# Patient Record
Sex: FEMALE | Employment: OTHER | URBAN - METROPOLITAN AREA
[De-identification: names, ages, dates, MRNs, and addresses within clinical notes are randomized per-mention and may not be internally consistent; named-entity substitution may affect disease eponyms.]

---

## 2017-05-22 ENCOUNTER — OFFICE VISIT (OUTPATIENT)
Dept: ENDOCRINOLOGY | Facility: CLINIC | Age: 67
End: 2017-05-22

## 2017-05-22 VITALS
DIASTOLIC BLOOD PRESSURE: 80 MMHG | SYSTOLIC BLOOD PRESSURE: 124 MMHG | WEIGHT: 141.2 LBS | HEART RATE: 73 BPM | BODY MASS INDEX: 22.69 KG/M2 | HEIGHT: 66 IN

## 2017-05-22 DIAGNOSIS — M85.9 LOW BONE DENSITY: ICD-10-CM

## 2017-05-22 DIAGNOSIS — E04.9 GOITER: ICD-10-CM

## 2017-05-22 DIAGNOSIS — Z78.0 POSTMENOPAUSAL STATUS: ICD-10-CM

## 2017-05-22 DIAGNOSIS — E03.8 SUBCLINICAL HYPOTHYROIDISM: ICD-10-CM

## 2017-05-22 DIAGNOSIS — E06.3 HASHIMOTO'S THYROIDITIS: ICD-10-CM

## 2017-05-22 DIAGNOSIS — E06.3 HASHIMOTO'S THYROIDITIS: Primary | ICD-10-CM

## 2017-05-22 LAB
CALCIUM SERPL-MCNC: 9 MG/DL (ref 8.5–10.1)
CREAT SERPL-MCNC: 0.64 MG/DL (ref 0.52–1.04)
GFR SERPL CREATININE-BSD FRML MDRD: NORMAL ML/MIN/1.7M2
PHOSPHATE SERPL-MCNC: 3.4 MG/DL (ref 2.5–4.5)
PTH-INTACT SERPL-MCNC: 54 PG/ML (ref 12–72)
TSH SERPL DL<=0.005 MIU/L-ACNC: 2.17 MU/L (ref 0.4–4)

## 2017-05-22 RX ORDER — LEVOTHYROXINE SODIUM 125 MCG
TABLET ORAL
Qty: 45 TABLET | Refills: 3 | Status: SHIPPED | OUTPATIENT
Start: 2017-05-22 | End: 2018-04-10

## 2017-05-22 ASSESSMENT — PAIN SCALES - GENERAL: PAINLEVEL: NO PAIN (0)

## 2017-05-22 NOTE — PATIENT INSTRUCTIONS
Osteoporosis Risk Score/FRAX score   Risk of Hip Fracture: 1.2 (Significant if >3%)  Risk of Overall major Fracture: 17 (Significant if >20%)      CALCIUM Recommendation 1200 mg/day in divided dose of no more than 500 mg/dose. This includes what is in your food and also what is in any supplements you are taking.      Dietary sources of calcium: These also contain vitamin D  Milk / buttermilk              8 oz            300 mg  Dry milk powder       5 Tbsp             300 mg  Yogurt                          1 cup           400 mg  Ice cream   1/2 cup          100 mg  Hard cheese                     1.5 oz          300 mg  Cottage cheese                1/2 cup        65 mg  Spinach, cooked  1 cup  240 mg  Tofu, soft regular           4 oz          120 to 390 mg  Sardines                       3 oz               370 mg  Mackerel canned         3 oz                250 mg  Canned salmon with bones 3 oz        170-210 mg  Calcium fortified cereals are available  SILK soy and almond milk products are calcium fortified  Orange juice  Fortified with Calcium       8 oz            300 mg  Low fat dairy sources are recommended      Recommended exercise goals:    30 minutes of moderate exercise on most days of the week .  Weight bearing exercise (ie, walking, jogging, hiking, dancing)    Strength training 2 or more times/week in addition to other weight -being exercise.  Strength training -- uses weight or resistance beyond that seen in everyday activities -(pilates, weight training with free weights, weight machines or resistance bands)

## 2017-05-22 NOTE — PROGRESS NOTES
"Assessment   1. Hashimoto's thyroiditis with goiter and subclinical (borderline) hypothyroidism- She is clinically euthyroid.   Continue synthroid at present dose.  She continues to require once/year follow up TFTS with thyroid exam, or sooner prn.  She may return to her PCP for this and see us prn, or she may return in one year.  Refilled Synthroid.     2.  Low bone density , lowest T-score -1.7. Height loss 1 inch.  FRAX 17/1.2.  I have counseled her on calcium intake and bone exercise.  See patient instructions.   Bone related labs today.     Post menopausal    Franchesca Szymanski MD   Endocrinology & Diabetes    HPI   Danae is a 66 year old woman who returns for follow up related to goiter and Hashimoto thyroiditis with borderline subclinical hypothyroidism. She was last seen by me 4/16.     I have been seeing her about once/year since 2006. She had initially reported ? Goiter had been  present since ? College .  The thyroid was 1.5-2 times normal in size at our lst meeting.  I started her on L-T4 2/06. She was not biochemically (highest TSH 2.84)  hypothyroid prior to starting the L-T4.   At our last visit  she was on 1/2 of the 125 mcg Synthroid tablet/day.  She continues on this.    We have the following recent labs, all on Care Everywhere ([facility])  2/21/12: TSH 2.72, freee T4 1.3  2/22/13: TSH 2.4, free T4 1.1, HgbA1c 5%  3/28/14; TSH 2.74, free T4 1.04  3/10/15; TSH 2.13, free T4 1.05    Past thyroid US 3/28/14 shows a heterogeneous gland consistent with Hashimoto thyroitiditis - without nodules.     DXA 9/20/16 showed lowest T-score -1.7 at the left femoral neck.  I have reveiwed the images and I agree with this interpretation.  I can't see the prior images to document change in BMD.    She has no history of bone fracture.  Her maximum adult height was 5'7. She was 5'6\" today.  Family hsitory osteoporosis in her mother.  She fractured her hip around age 90.  Her father also fractured a hip aruond age 95.  " "    ROS   Weight down 3 # since last yaer.  Eats a rigid diet.   Cardiac: negative  GI: constipation  Had a knee replacement Halloween  Walking is better  Arthritis pain - hips  No bone pain  + low back piain  Never had a kidney stones.     Personal Hx   Behavioral history: No tobacco use.   Home environment: No secondhand tobacco smoke in home.    - Plays a lot of golf in the summer; she has no formal exercise otehrwise.     Milk 1 glass/day and in coffee  Yogurt qod  Cottage cheese weekly  Ice cream not often  Cheese daily    PMH   Past Medical History:   Diagnosis Date     Hashimoto's thyroiditis     goiter, subclinical hypothyroid     Post-menopausal      Rheumatoid arthritis, adult (H)     plaquenil     Past Surgical History:   Procedure Laterality Date     ARTHROSCOPY KNEE RT/LT      rt      SECTION       STRIP VEIN Bilateral      THORACOTOMY Left     congenital lung cyst     TONSILLECTOMY  age 3-4 years     Current Outpatient Prescriptions   Medication     hydroxychloroquine (PLAQUENIL) 200 MG tablet     SYNTHROID 125 MCG tablet     meloxicam (MOBIC) 15 MG tablet     No current facility-administered medications for this visit.      She is not taking calcium or vitamin D    Physical Exam   GENERAL: pleasant woman in no apparent distress.   /80  Pulse 73  Ht 1.676 m (5' 6\")  Wt 64 kg (141 lb 3.2 oz)  BMI 22.79 kg/m2  SKIN: normal color, normal temperature   HEENT: PER,  no scleral icterus, eyelid retraction, stare, lid lag, proptosis or conjunctival injection.   NECK: No visible neck masses, cervical adenopathy; thyroid is palpable, not enlarged. The surface is mildly irregular, without dominant nodule.   LUNGS: clear to auscultation bilaterally    CARDIAC: RRR, S1, S2 without murmurs, rubs or gallops.   NEURO: Alert responds appropriately to questions, moves all extremities, DTRs 1/4; gait normal, no tremor of the outstretched hand.   EXTREMITIES: No edema.       ENDO THYROID " LABS-[facility] [[email or web address]] Latest Ref Rng & Units 4/11/2016 2/16/2011   TSH 0.40 - 4.00 mU/L 2.53 1.54   T4 FREE 0.76 - 1.46 ng/dL 0.96 0.97     ENDO THYROID LABS-[facility] [[email or web address]] Latest Ref Rng & Units 11/30/2009   TSH 0.40 - 4.00 mU/L 1.45   T4 FREE 0.76 - 1.46 ng/dL 1.16

## 2017-05-22 NOTE — LETTER
5/22/2017       RE: Danae Wilson  [address]  [facility] [address]     Dear Colleague,    Thank you for referring your patient, Danae Wilson, to the [facility] ENDOCR[facility] at [facility]. Please see a copy of my visit note below.    Assessment   1. Hashimoto's thyroiditis with goiter and subclinical (borderline) hypothyroidism- She is clinically euthyroid.   Continue synthroid at present dose.  She continues to require once/year follow up TFTS with thyroid exam, or sooner prn.  She may return to her PCP for this and see us prn, or she may return in one year.  Refilled Synthroid.     2.  Low bone density , lowest T-score -1.7. Height loss 1 inch.  FRAX 17/1.2.  I have counseled her on calcium intake and bone exercise.  See patient instructions.   Bone related labs today.     Post menopausal    Franchesca Szymanski MD   Endocrinology & Diabetes    HPI   Danae is a 66 year old woman who returns for follow up related to goiter and Hashimoto thyroiditis with borderline subclinical hypothyroidism. She was last seen by me 4/16.     I have been seeing her about once/year since 2006. She had initially reported ? Goiter had been  present since ? College .  The thyroid was 1.5-2 times normal in size at our lst meeting.  I started her on L-T4 2/06. She was not biochemically (highest TSH 2.84)  hypothyroid prior to starting the L-T4.   At our last visit  she was on 1/2 of the 125 mcg Synthroid tablet/day.  She continues on this.    We have the following recent labs, all on [facility] Everywhere ([facility])  2/21/12: TSH 2.72, freee T4 1.3  2/22/13: TSH 2.4, free T4 1.1, HgbA1c 5%  3/28/14; TSH 2.74, free T4 1.04  3/10/15; TSH 2.13, free T4 1.05    Past thyroid US 3/28/14 shows a heterogeneous gland consistent with Hashimoto thyroitiditis - without nodules.     DXA 9/20/16 showed lowest T-score -1.7 at the left femoral neck.  I have reveiwed the images and I agree with this interpretation.  I  "can't see the prior images to document change in BMD.    She has no history of bone fracture.  Her maximum adult height was 5'7. She was 5'6\" today.  Family hsitory osteoporosis in her mother.  She fractured her hip around age 90.  Her father also fractured a hip aruond age 95.      ROS   Weight down 3 # since last yaer.  Eats a rigid diet.   Cardiac: negative  GI: constipation  Had a knee replacement Halloween  Walking is better  Arthritis pain - hips  No bone pain  + low back piain  Never had a kidney stones.     Personal Hx   Behavioral history: No tobacco use.   Home environment: No secondhand tobacco smoke in home.    - Plays a lot of golf in the summer; she has no formal exercise otehrwise.     Milk 1 glass/day and in coffee  Yogurt qod  Cottage cheese weekly  Ice cream not often  Cheese daily    PMH   Past Medical History:   Diagnosis Date     Hashimoto's thyroiditis     goiter, subclinical hypothyroid     Post-menopausal      Rheumatoid arthritis, adult (H)     plaquenil     Past Surgical History:   Procedure Laterality Date     ARTHROSCOPY KNEE RT/LT      rt      SECTION       STRIP VEIN Bilateral      THORACOTOMY Left     congenital lung cyst     TONSILLECTOMY  age 3-4 years     Current Outpatient Prescriptions   Medication     hydroxychloroquine (PLAQUENIL) 200 MG tablet     SYNTHROID 125 MCG tablet     meloxicam (MOBIC) 15 MG tablet     No current facility-administered medications for this visit.      She is not taking calcium or vitamin D    Physical Exam   GENERAL: pleasant woman in no apparent distress.   /80  Pulse 73  Ht 1.676 m (5' 6\")  Wt 64 kg (141 lb 3.2 oz)  BMI 22.79 kg/m2  SKIN: normal color, normal temperature   HEENT: PER,  no scleral icterus, eyelid retraction, stare, lid lag, proptosis or conjunctival injection.   NECK: No visible neck masses, cervical adenopathy; thyroid is palpable, not enlarged. The surface is mildly irregular, without dominant nodule. "   LUNGS: clear to auscultation bilaterally    CARDIAC: RRR, S1, S2 without murmurs, rubs or gallops.   NEURO: Alert responds appropriately to questions, moves all extremities, DTRs 1/4; gait normal, no tremor of the outstretched hand.   EXTREMITIES: No edema.       ENDO THYROID LABS-[facility] Latest Ref Rng & Units 4/11/2016 2/16/2011   TSH 0.40 - 4.00 mU/L 2.53 1.54   T4 FREE 0.76 - 1.46 ng/dL 0.96 0.97     ENDO THYROID LABS-[facility] Latest Ref Rng & Units 11/30/2009   TSH 0.40 - 4.00 mU/L 1.45   T4 FREE 0.76 - 1.46 ng/dL 1.16       Sincerely,    Franchesca Szymanski MD

## 2017-05-22 NOTE — NURSING NOTE
Chief Complaint   Patient presents with     RECHECK     Hashimoto's follow up     Leslie Washington CMA

## 2017-05-22 NOTE — MR AVS SNAPSHOT
After Visit Summary   5/22/2017    Danae Wilson    MRN: [id number]           Patient Information     Date Of Birth          1950        Visit Information        Provider Department      5/22/2017 4:00 PM Franchesca Szymanski MD [facility]        Today's Diagnoses     Hashimoto's thyroiditis    -  1    Subclinical hypothyroidism        Goiter        Low bone density        Postmenopausal status          Care Instructions    Osteoporosis Risk Score/FRAX score   Risk of Hip Fracture: 1.2 (Significant if >3%)  Risk of Overall major Fracture: 17 (Significant if >20%)      CALCIUM Recommendation 1200 mg/day in divided dose of no more than 500 mg/dose. This includes what is in your food and also what is in any supplements you are taking.      Dietary sources of calcium: These also contain vitamin D  Milk / buttermilk              8 oz            300 mg  Dry milk powder       5 Tbsp             300 mg  Yogurt                          1 cup           400 mg  Ice cream   1/2 cup          100 mg  Hard cheese                     1.5 oz          300 mg  Cottage cheese                1/2 cup        65 mg  Spinach, cooked  1 cup  240 mg  Tofu, soft regular           4 oz          120 to 390 mg  Sardines                       3 oz               370 mg  Mackerel canned         3 oz                250 mg  Canned salmon with bones 3 oz        170-210 mg  Calcium fortified cereals are available  SILK soy and almond milk products are calcium fortified  Orange juice  Fortified with Calcium       8 oz            300 mg  Low fat dairy sources are recommended      Recommended exercise goals:    30 minutes of moderate exercise on most days of the week .  Weight bearing exercise (ie, walking, jogging, hiking, dancing)    Strength training 2 or more times/week in addition to other weight -being exercise.  Strength training -- uses weight or resistance beyond that seen in everyday activities -(tripp weight  training with free weights, weight machines or resistance bands)                Follow-ups after your visit        Your next 10 appointments already scheduled     May 22, 2017  4:00 PM CDT   (Arrive by 3:45 PM)   RETURN ENDOCRINE with Franchesca Szymanski MD   [facility] ([facility] [[email or web address])    [address] [place] [id number] [phone]              Future tests that were ordered for you today     Open Future Orders        Priority Expected Expires Ordered    TSH with free T4 reflex Routine  2018    Calcium Routine  2018    Phosphorus Routine  2018    25 Hydroxyvitamin D2 and D3 Routine  2018    Parathyroid Hormone Intact Routine  2018    Creatinine Routine  2018            Who to contact     Please call your clinic at [phone] to:    Ask questions about your health    Make or cancel appointments    Discuss your medicines    Learn about your test results    Speak to your doctor   If you have compliments or concerns about an experience at your clinic, or if you wish to file a complaint, please contact [facility] Patient Relations at [phone] or email us at [email or web address]         Additional Information About Your Visit        AppGyverhart Information     [employer] is an electronic gateway that provides easy, online access to your medical records. With [employer], you can request a clinic appointment, read your test results, renew a prescription or communicate with your care team.     To sign up for [employer] visit the website at [email or web address]   You will be asked to enter the access code listed below, as well as some personal information. Please follow the directions to create your username and password.     Your access code is: [email or web address]  Expires: 2017  6:30 AM     Your access code will  in 90 days. If you  "need help or a new code, please contact your [facility] or call [phone] for assistance.        Care EveryWhere ID     This is your Care EveryWhere ID. This could be used by other organizations to access your [place] medical records  [id number]        Your Vitals Were     Pulse Height BMI (Body Mass Index)             73 1.676 m (5' 6\") 22.79 kg/m2          Blood Pressure from Last 3 Encounters:   05/22/17 124/80   09/20/16 110/60   04/11/16 114/80    Weight from Last 3 Encounters:   05/22/17 64 kg (141 lb 3.2 oz)   09/20/16 63.5 kg (140 lb)   04/11/16 65.3 kg (144 lb)                 Where to get your medicines      These medications were sent to [facility] [address] [facility]  [address] [facility] [address]     Phone:  [phone]     SYNTHROID 125 MCG tablet          Primary Care Provider Office Phone # Fax #    Catalina Patrick [phone]       [facility] [address] [facility] [address]  [place] [address]        Thank you!     Thank you for choosing [facility]IN[facility]  for your care. Our goal is always to provide you with excellent care. Hearing back from our patients is one way we can continue to improve our services. Please take a few minutes to complete the written survey that you may receive in the mail after your visit with us. Thank you!             Your Updated Medication List - Protect others around you: Learn how to safely use, store and throw away your medicines at www.disposemymeds.org.          This list is accurate as of: 5/22/17  3:33 PM.  Always use your most recent med list.                   Brand Name Dispense Instructions for use    meloxicam 15 MG tablet    MOBIC     Take 1 tablet by mouth daily.       PLAQUENIL 200 MG tablet   Generic drug:  hydroxychloroquine      200 mg 2 times daily       SYNTHROID 125 MCG tablet   Generic drug:  levothyroxine     45 " tablet    Take 1/2 tablet daily

## 2017-05-23 LAB
DEPRECATED CALCIDIOL+CALCIFEROL SERPL-MC: NORMAL UG/L (ref 20–75)
VITAMIN D2 SERPL-MCNC: <5 UG/L
VITAMIN D3 SERPL-MCNC: 31 UG/L

## 2017-09-25 ENCOUNTER — OFFICE VISIT (OUTPATIENT)
Dept: OBGYN | Facility: CLINIC | Age: 67
End: 2017-09-25
Payer: COMMERCIAL

## 2017-09-25 VITALS
BODY MASS INDEX: 22.5 KG/M2 | DIASTOLIC BLOOD PRESSURE: 62 MMHG | HEART RATE: 60 BPM | WEIGHT: 140 LBS | HEIGHT: 66 IN | SYSTOLIC BLOOD PRESSURE: 100 MMHG

## 2017-09-25 DIAGNOSIS — Z01.419 ENCOUNTER FOR GYNECOLOGICAL EXAMINATION WITHOUT ABNORMAL FINDING: Primary | ICD-10-CM

## 2017-09-25 PROCEDURE — 87624 HPV HI-RISK TYP POOLED RSLT: CPT | Performed by: OBSTETRICS & GYNECOLOGY

## 2017-09-25 PROCEDURE — 99397 PER PM REEVAL EST PAT 65+ YR: CPT | Performed by: OBSTETRICS & GYNECOLOGY

## 2017-09-25 PROCEDURE — G0145 SCR C/V CYTO,THINLAYER,RESCR: HCPCS | Performed by: OBSTETRICS & GYNECOLOGY

## 2017-09-25 ASSESSMENT — ANXIETY QUESTIONNAIRES
7. FEELING AFRAID AS IF SOMETHING AWFUL MIGHT HAPPEN: NOT AT ALL
GAD7 TOTAL SCORE: 0
1. FEELING NERVOUS, ANXIOUS, OR ON EDGE: NOT AT ALL
5. BEING SO RESTLESS THAT IT IS HARD TO SIT STILL: NOT AT ALL
6. BECOMING EASILY ANNOYED OR IRRITABLE: NOT AT ALL
2. NOT BEING ABLE TO STOP OR CONTROL WORRYING: NOT AT ALL
3. WORRYING TOO MUCH ABOUT DIFFERENT THINGS: NOT AT ALL

## 2017-09-25 ASSESSMENT — PATIENT HEALTH QUESTIONNAIRE - PHQ9
5. POOR APPETITE OR OVEREATING: NOT AT ALL
SUM OF ALL RESPONSES TO PHQ QUESTIONS 1-9: 0

## 2017-09-25 NOTE — MR AVS SNAPSHOT
"              After Visit Summary   2017    Danae Wilson    MRN: [id number]           Patient Information     Date Of Birth          1950        Visit Information        Provider Department      2017 10:15 AM Zeus Bryant MD [facility]        Today's Diagnoses     Encounter for gynecological examination without abnormal finding    -  1       Follow-ups after your visit        Who to contact     If you have questions or need follow up information about today's clinic visit or your schedule please contact [facility] directly at [phone].  Normal or non-critical lab and imaging results will be communicated to you by [employer], letter or phone within 4 business days after the clinic has received the results. If you do not hear from us within 7 days, please contact the clinic through [employer] or phone. If you have a critical or abnormal lab result, we will notify you by phone as soon as possible.  Submit refill requests through [employer] or call your pharmacy and they will forward the refill request to us. Please allow 3 business days for your refill to be completed.          Additional Information About Your Visit        MyChart Information     [employer] lets you send messages to your doctor, view your test results, renew your prescriptions, schedule appointments and more. To sign up, go to [email or web address] . Click on \"Log in\" on the left side of the screen, which will take you to the Welcome page. Then click on \"Sign up Now\" on the right side of the page.     You will be asked to enter the access code listed below, as well as some personal information. Please follow the directions to create your username and password.     Your access code is: [email or web address]  Expires: 2017 10:53 AM     Your access code will  in 90 days. If you need help or a new code, please call your [place] clinic or [phone].        Care EveryWhere ID     This is your " "Care EveryWhere ID. This could be used by other organizations to access your [place] medical records  [id number]        Your Vitals Were     Pulse Height BMI (Body Mass Index)             60 5' 6\" (1.676 m) 22.6 kg/m2          Blood Pressure from Last 3 Encounters:   09/25/17 100/62   05/22/17 124/80   09/20/16 110/60    Weight from Last 3 Encounters:   09/25/17 140 lb (63.5 kg)   05/22/17 141 lb 3.2 oz (64 kg)   09/20/16 140 lb (63.5 kg)              We Performed the Following     HPV High Risk Types DNA Cervical     Pap imaged thin layer screen with HPV - recommended age 30 - 65        Primary Care Provider Office Phone # Fax #    Catalina Patrick [phone]       [facility] [address] [facility]  [facility] [address]        Equal Access to Services     YAZAN URRUTIA : Hadii aad ku hadasho Soomaali, waaxda luqadaha, qaybta kaalmada adeegyada, waxay aidenin haynicolen iris cardoza. So [facility] [phone].    ATENCIÓN: Si habla español, tiene a vasquez disposición servicios gratuitos de asistencia lingüística. Llame al [phone].    We comply with applicable federal civil rights laws and [place] laws. We do not discriminate on the basis of race, color, national origin, age, disability sex, sexual orientation or gender identity.            Thank you!     Thank you for choosing [facility]  for your care. Our goal is always to provide you with excellent care. Hearing back from our patients is one way we can continue to improve our services. Please take a few minutes to complete the written survey that you may receive in the mail after your visit with us. Thank you!             Your Updated Medication List - Protect others around you: Learn how to safely use, store and throw away your medicines at www.disposemymeds.org.          This list is accurate as of: 9/25/17 10:53 AM.  Always use your most recent med list.                   Brand Name Dispense Instructions for " use Diagnosis    meloxicam 15 MG tablet    MOBIC     Take 1 tablet by mouth daily.    Hashimoto thyroiditis       PLAQUENIL 200 MG tablet   Generic drug:  hydroxychloroquine      200 mg 2 times daily        SYNTHROID 125 MCG tablet   Generic drug:  levothyroxine     45 tablet    Take 1/2 tablet daily    Goiter, Hashimoto's thyroiditis, Subclinical hypothyroidism

## 2017-09-25 NOTE — LETTER
October 4, 2017    Danae Wilson  [address] [address]    Dear Danae,  We are happy to inform you that your PAP smear result from 9/25/17 is normal.  We are now able to do a follow up test on PAP smears. The DNA test is for HPV (Human Papilloma Virus). Cervical cancer is closely linked with certain types of HPV. Your result showed no evidence of high risk HPV.  Therefore we recommend you return in 1 year for your next pap smear.  You will still need to return to the clinic every year for an annual exam and other preventive tests.  Please contact the clinic at [phone] with any questions.  Sincerely,    Zeus Bryant MD/luis

## 2017-09-26 ASSESSMENT — ANXIETY QUESTIONNAIRES: GAD7 TOTAL SCORE: 0

## 2017-09-27 LAB
COPATH REPORT: NORMAL
PAP: NORMAL

## 2017-09-29 LAB
FINAL DIAGNOSIS: NORMAL
HPV HR 12 DNA CVX QL NAA+PROBE: NEGATIVE
HPV16 DNA SPEC QL NAA+PROBE: NEGATIVE
HPV18 DNA SPEC QL NAA+PROBE: NEGATIVE
SPECIMEN DESCRIPTION: NORMAL

## 2018-02-28 NOTE — PROGRESS NOTES
Danae is a 67 year old No obstetric history on file. female who presents for annual exam.     Besides routine health maintenance, she has no other health concerns today .    Do you have a Health Care Directive?: No: Advance care planning was reviewed with patient; patient declined at this time.      Fall risk:   Fallen 2 or more times in the past year?: No  Any fall with injury in the past year?: No      HPI: The patient is seen for annual exam. She has no current concerns or changes. She is on no replacement therapy.  The patient's PCP is Catalina Patrick MD.      GYNECOLOGIC HISTORY:No LMP recorded. Patient is postmenopausal..   reports that she has never smoked. She has never used smokeless tobacco.      Patient is sexually active.  STD testing offered?  Declined  Last PHQ-9 score on record=   PHQ-9 SCORE 2017   Total Score 0     Last GAD7 score on record=   CHANTELLE-7 SCORE 2016   Total Score 0 0     Alcohol Score = 2    HEALTH MAINTENANCE:  Cholesterol: (No results found for: CHOL   Last Mammo: 9/15/17 @ [facility], Result: normal, Next Mammo: 2018   Pap: 16 neg  DEXA:  16  Colonoscopy:  3/31/15, Result:  normal, Next Colonoscopy: due next year    Health maintenance updated:  yes    HISTORY:  Obstetric History     No data available        Patient Active Problem List   Diagnosis     Subclinical hypothyroidism     Fatigue     Hashimoto's thyroiditis     Goiter     Past Surgical History:   Procedure Laterality Date     ARTHROSCOPY KNEE RT/LT      rt      SECTION       STRIP VEIN Bilateral      THORACOTOMY Left     congenital lung cyst     TONSILLECTOMY  age 3-4 years      Social History   Substance Use Topics     Smoking status: Never Smoker     Smokeless tobacco: Never Used     Alcohol use 1.2 - 1.8 oz/week     2 - 3 Standard drinks or equivalent per week      Problem (# of Occurrences) Relation (Name,Age of Onset)    Osteoarthritis (1) Other (mult)       Negative  This writer attempted to contact appt on 02/28/18      Reason for call refill/appt and left detailed message.      If patient calls back:   Schedule Office Visit appointment at his convenience with PCP, document that pt called and close encounter         Bianka Jorgensen CMA     "family history of: Coronary Artery Disease, Hypertension            Current Outpatient Prescriptions   Medication Sig     SYNTHROID 125 MCG tablet Take 1/2 tablet daily     hydroxychloroquine (PLAQUENIL) 200 MG tablet 200 mg 2 times daily     meloxicam (MOBIC) 15 MG tablet Take 1 tablet by mouth daily.     No current facility-administered medications for this visit.        No Known Allergies    Past medical, surgical, social and family history were reviewed and updated in EPIC.    ROS:   12 point review of systems negative other than symptoms noted below.    EXAM:  /62  Pulse 60  Ht 5' 6\" (1.676 m)  Wt 140 lb (63.5 kg)  BMI 22.6 kg/m2   BMI: Body mass index is 22.6 kg/(m^2).    EXAM:  Constitutional: Appearance: Well nourished, well developed alert, in no acute distress  Neck:  Lymph Nodes:  No lymphadenopathy present    Thyroid:  Gland size normal, nontender, no nodules or masses present  on palpation  Chest:  Respiratory Effort:  Breathing unlabored  Cardiovascular:Heart    Auscultation:  Regular rate, normal rhythm, no murmurs present  Breasts: Inspection of Breasts:  No lymphadenopathy present., Palpation of Breasts and Axillae:  No masses present on palpation, no breast tenderness., Axillary Lymph Nodes:  No lymphadenopathy present. and No nodularity, asymmetry or nipple discharge bilaterally.  Gastrointestinal:  Abdominal Examination:  Abdomen nontender to palpation, tone normal without     rigidity or guarding, no masses present, umbilicus without lesions    Liver and speen:  No hepatomegaly present, liver nontender to palpation    Hernias:  No hernias present  Lymphatic: Lymph Nodes:  No other lymphadenopathy present  Skin:  General Inspection:  No rashes present, no lesions present, no areas of  discoloration.    Genitalia and Groin:  No rashes present, no lesions present, no areas of  discoloration, no masses present  Neurologic/Psychiatric:    Mental Status:  Oriented X3     Pelvic " Exam:  External Genitalia:     Normal appearance for age, no discharge present, no tenderness present, no inflammatory lesions present, color normal  Vagina:     Normal vaginal vault without central or paravaginal defects, ATROPHIC  Bladder:     Nontender to palpation  Urethra:   Urethral Body:  Urethra palpation normal, urethra structural support normal   Urethral Meatus:  No erythema or lesions present  Cervix:     Appearance healthy, no lesions present, nontender to palpation, no bleeding present  Uterus:     Nontender to palpation, no masses present, position anteflexed, mobility: normal  Adnexa:     No adnexal tenderness present, no adnexal masses present  Perineum:     Perineum within normal limits, no evidence of trauma, no rashes or skin lesions present  Inguinal Lymph Nodes:     No lymphadenopathy present        COUNSELING:   Reviewed preventive health counseling, as reflected in patient instructions       Regular exercise       Healthy diet/nutrition    BMI:  Body mass index is 22.6 kg/(m^2).     reports that she has never smoked. She has never used smokeless tobacco.        ASSESSMENT:  67 year old female with satisfactory annual exam.  ICD-10-CM    1. Encounter for gynecological examination without abnormal finding Z01.419 Pap imaged thin layer screen with HPV - recommended age 30 - 65     HPV High Risk Types DNA Cervical       PLAN: The patient is seen at this time for her annual examination. She had a mammogram 1 week ago that was read as normal and conveyed to her. We discussed ongoing yearly screening.      Zeus Bryant MD

## 2018-04-09 ENCOUNTER — TRANSFERRED RECORDS (OUTPATIENT)
Dept: HEALTH INFORMATION MANAGEMENT | Facility: CLINIC | Age: 68
End: 2018-04-09

## 2018-04-10 ENCOUNTER — OFFICE VISIT (OUTPATIENT)
Dept: ENDOCRINOLOGY | Facility: CLINIC | Age: 68
End: 2018-04-10
Payer: COMMERCIAL

## 2018-04-10 VITALS
HEART RATE: 69 BPM | HEIGHT: 66 IN | BODY MASS INDEX: 24.03 KG/M2 | DIASTOLIC BLOOD PRESSURE: 77 MMHG | WEIGHT: 149.5 LBS | SYSTOLIC BLOOD PRESSURE: 126 MMHG

## 2018-04-10 DIAGNOSIS — E06.3 HASHIMOTO'S THYROIDITIS: ICD-10-CM

## 2018-04-10 DIAGNOSIS — E04.9 GOITER: ICD-10-CM

## 2018-04-10 DIAGNOSIS — M85.9 LOW BONE DENSITY: ICD-10-CM

## 2018-04-10 DIAGNOSIS — E03.8 SUBCLINICAL HYPOTHYROIDISM: Primary | ICD-10-CM

## 2018-04-10 DIAGNOSIS — E03.8 SUBCLINICAL HYPOTHYROIDISM: ICD-10-CM

## 2018-04-10 LAB
T4 FREE SERPL-MCNC: 0.96 NG/DL (ref 0.76–1.46)
TSH SERPL DL<=0.005 MIU/L-ACNC: 1.89 MU/L (ref 0.4–4)

## 2018-04-10 RX ORDER — LEVOTHYROXINE SODIUM 125 MCG
TABLET ORAL
Qty: 45 TABLET | Refills: 3 | Status: SHIPPED | OUTPATIENT
Start: 2018-04-10 | End: 2019-09-24

## 2018-04-10 ASSESSMENT — PAIN SCALES - GENERAL: PAINLEVEL: NO PAIN (0)

## 2018-04-10 NOTE — PATIENT INSTRUCTIONS
Your next bone density should be 9/20/18 or thereafter.  It should be performed in the same location, same machine as the last one, to allow comparison.      In general, you should Continue to have yearly thyroid function tests for life, or sooner if a concern arises. The doctor who is ordering the labs and seeing you should be the one who is filling the Rx for levothyroxine. You could simplify and reduce the cost of your care by turning this  back over to the primary care physician, reserving follow up with us for if a new concern arises.

## 2018-04-10 NOTE — PROGRESS NOTES
Endocrinology Attending Physician Progress note    Assessment   1. Hashimoto's thyroiditis with goiter and subclinical (borderline) hypothyroidism- She is clinically euthyroid.   Labs today, following the appt, are normal.    She continues to require once/year follow up TFTS with thyroid exam, or sooner prn.  She may return to her PCP for this and see us prn, or she may return in one year.  Refilled Synthroid.     2.  Low bone density , lowest T-score -1.7. Height loss 1 inch.  FRAX 17/1.2.   Next dxa 9/20/18 or thereafter. Order placed.     Post menopausal    Franchesca Szymanski MD   Endocrinology & Diabetes    HPI   Danae is a 67 year old woman who returns for follow up related to goiter and Hashimoto thyroiditis with borderline subclinical hypothyroidism. She was last seen by me 5/17.     I have been seeing her about once/year since 2006. She had initially reported ? Goiter had been  present since ? College .  The thyroid was 1.5-2 times normal in size at our lst meeting.  I started her on L-T4 2/06. She was not biochemically (highest TSH 2.84)  hypothyroid prior to starting the L-T4.   At our last visit  she was on 1/2 of the 125 mcg Synthroid tablet/day.  She continues on this.  TSH was 2.17 on 5/22/17.    Past thyroid US 3/28/14 shows a heterogeneous gland consistent with Hashimoto thyroitiditis - without nodules.     DXA 9/20/16 showed lowest T-score -1.7 at the left femoral neck. I can't see the prior images to document change in BMD.    She has no history of bone fracture.  Her maximum adult height was 5'7.   Family history osteoporosis in her mother who fractured her hip around age 90.  Her father also fractured a hip aruond age 95.      MARIBELL   Feels good  Energy is OK  Sleep is good  Cardiac: negative  Respiratory: negative  GI: had colonoscopy yesterday; constipated;   No pains  No history of fractures.    Personal Hx   Behavioral history: No tobacco use.   Home environment: No secondhand tobacco smoke in  "home.    - Plays a lot of golf in the summer; she has no formal exercise otehrwise.     Milk 1 glass/day and in coffee  Yogurt qod  Cottage cheese weekly  Ice cream not often  Cheese daily    PMH   Past Medical History:   Diagnosis Date     Hashimoto's thyroiditis     goiter, subclinical hypothyroid     Papanicolaou smear of cervix with low grade squamous intraepithelial lesion (LGSIL) 2005 LSIL 05 [facility].  ECC-focal area of atypia Normal paps since     Post-menopausal      Rheumatoid arthritis, adult (H)     plaquenil     Past Surgical History:   Procedure Laterality Date     ARTHROSCOPY KNEE RT/LT      rt      SECTION       STRIP VEIN Bilateral      THORACOTOMY Left     congenital lung cyst     TONSILLECTOMY  age 3-4 years     Current Outpatient Prescriptions   Medication     SYNTHROID 125 MCG tablet     hydroxychloroquine (PLAQUENIL) 200 MG tablet     meloxicam (MOBIC) 15 MG tablet     No current facility-administered medications for this visit.      She is not taking calcium or vitamin D    Physical Exam   GENERAL: pleasant woman in no apparent distress.   /77 (BP Location: Right arm)  Pulse 69  Ht 1.67 m (5' 5.75\")  Wt 67.8 kg (149 lb 8 oz)  BMI 24.32 kg/m2  SKIN: normal color, normal temperature   HEENT: PER,  no scleral icterus, eyelid retraction, stare, lid lag, proptosis or conjunctival injection.   NECK: No visible neck masses, cervical adenopathy; thyroid is palpable, not enlarged.  LUNGS: clear to auscultation bilaterally    CARDIAC: RRR, S1, S2 without murmurs, rubs or gallops.   NEURO: Alert responds appropriately to questions, moves all extremities, DTRs 1/4; gait normal, no tremor of the outstretched hand.   EXTREMITIES: No edema.     Results for JOSÉ MIGUEL TIMMONS (MRN [id number]) as of 4/10/2018 22:02   Ref. Range 4/10/2018 16:01   T4 Free Latest Ref Range: 0.76 - 1.46 ng/dL 0.96   TSH Latest Ref Range: 0.40 - 4.00 mU/L 1.89     ENDO THYROID LABS-[facility] " Latest Ref Rng & Units 5/22/2017 4/11/2016   TSH 0.40 - 4.00 mU/L 2.17 2.53   T4 FREE 0.76 - 1.46 ng/dL  0.96

## 2018-04-10 NOTE — NURSING NOTE
"Chief Complaint   Patient presents with     RECHECK     HASHIMOTO'S THYROIDITIS F/U       Initial /77 (BP Location: Right arm)  Pulse 69  Ht 1.67 m (5' 5.75\")  Wt 67.8 kg (149 lb 8 oz)  BMI 24.32 kg/m2 Estimated body mass index is 24.32 kg/(m^2) as calculated from the following:    Height as of this encounter: 1.67 m (5' 5.75\").    Weight as of this encounter: 67.8 kg (149 lb 8 oz).  Medication Reconciliation: complete    "

## 2018-04-10 NOTE — MR AVS SNAPSHOT
After Visit Summary   4/10/2018    Danae Wilson    MRN: [id number]           Patient Information     Date Of Birth          1950        Visit Information        Provider Department      4/10/2018 3:20 PM Franchesca Szymanski MD [facility]        Today's Diagnoses     Subclinical hypothyroidism    -  1    Hashimoto's thyroiditis        Goiter        Low bone density          Care Instructions    Your next bone density should be 9/20/18 or thereafter.  It should be performed in the same location, same machine as the last one, to allow comparison.      In general, you should Continue to have yearly thyroid function tests for life, or sooner if a concern arises. The doctor who is ordering the labs and seeing you should be the one who is filling the Rx for levothyroxine. You could simplify and reduce the cost of your care by turning this  back over to the primary care physician, reserving follow up with us for if a new concern arises.                     Follow-ups after your visit        Future tests that were ordered for you today     Open Future Orders        Priority Expected Expires Ordered    Dexa hip/pelvis/spine Routine 9/20/2018 4/10/2019 4/10/2018            Who to contact     Please call your clinic at [phone] to:    Ask questions about your health    Make or cancel appointments    Discuss your medicines    Learn about your test results    Speak to your doctor            Additional Information About Your Visit        MyChart Information     CatchFree is an electronic gateway that provides easy, online access to your medical records. With CatchFree, you can request a clinic appointment, read your test results, renew a prescription or communicate with your care team.     To sign up for [employer] visit the website at [email or web address]   You will be asked to enter the access code listed below, as well as some personal information. Please follow the directions to create your  "username and password.     Your access code is: [email or web address]  Expires: 2018  6:31 AM     Your access code will  in 90 days. If you need help or a new code, please contact your [facility] or call [phone] for assistance.        Care EveryWhere ID     This is your Care EveryWhere ID. This could be used by other organizations to access your [place] medical records  [id number]        Your Vitals Were     Pulse Height BMI (Body Mass Index)             69 1.67 m (5' 5.75\") 24.32 kg/m2          Blood Pressure from Last 3 Encounters:   04/10/18 126/77   17 100/62   17 124/80    Weight from Last 3 Encounters:   04/10/18 67.8 kg (149 lb 8 oz)   17 63.5 kg (140 lb)   17 64 kg (141 lb 3.2 oz)                 Where to get your medicines      These medications were sent to [facility] [id number] [place] [address]  [id number] [place] [id number]     Phone:  [phone]     SYNTHROID 125 MCG tablet          Primary Care Provider Office Phone # Fax #    Catalina Patrick [phone]       [address] [address]        Equal Access to Services     YAZAN URRUTIA AH: Hadii aad ku hadasho Soomaali, waaxda luqadaha, qaybta kaalmada adeegyada, inna cardoza. So [facility] [phone].    ATENCIÓN: Si habla español, tiene a vasquez disposición servicios gratuitos de asistencia lingüística. Llame al [phone].    We comply with applicable federal civil rights laws and [place] laws. We do not discriminate on the basis of race, color, national origin, age, disability, sex, sexual orientation, or gender identity.            Thank you!     Thank you for choosing [facility]  for your care. Our goal is always to provide you with excellent care. Hearing back from our patients is one way we can continue to improve our " services. Please take a few minutes to complete the written survey that you may receive in the mail after your visit with us. Thank you!             Your Updated Medication List - Protect others around you: Learn how to safely use, store and throw away your medicines at www.disposemymeds.org.          This list is accurate as of 4/10/18 10:02 PM.  Always use your most recent med list.                   Brand Name Dispense Instructions for use Diagnosis    meloxicam 15 MG tablet    MOBIC     Take 1 tablet by mouth daily.    Hashimoto thyroiditis       PLAQUENIL 200 MG tablet   Generic drug:  hydroxychloroquine      200 mg 2 times daily        SYNTHROID 125 MCG tablet   Generic drug:  levothyroxine     45 tablet    Take 1/2 tablet daily    Goiter, Hashimoto's thyroiditis, Subclinical hypothyroidism

## 2018-04-10 NOTE — LETTER
4/10/2018       RE: Danae Wilson  [address]  [facility] [place] [address]     Dear Colleague,    Thank you for referring your patient, Danae Wilson, to the [facility] ENDOCRINOLOGY at [facility]. Please see a copy of my visit note below.    Endocrinology Attending Physician Progress note    Assessment   1. Hashimoto's thyroiditis with goiter and subclinical (borderline) hypothyroidism- She is clinically euthyroid.   Labs today, following the appt, are normal.    She continues to require once/year follow up TFTS with thyroid exam, or sooner prn.  She may return to her PCP for this and see us prn, or she may return in one year.  Refilled Synthroid.     2.  Low bone density , lowest T-score -1.7. Height loss 1 inch.  FRAX 17/1.2.   Next dxa 9/20/18 or thereafter. Order placed.     Post menopausal    Franchesca Szymanski MD   Endocrinology & Diabetes    HPI   Danae is a 67 year old woman who returns for follow up related to goiter and Hashimoto thyroiditis with borderline subclinical hypothyroidism. She was last seen by me 5/17.     I have been seeing her about once/year since 2006. She had initially reported ? Goiter had been  present since ? College .  The thyroid was 1.5-2 times normal in size at our lst meeting.  I started her on L-T4 2/06. She was not biochemically (highest TSH 2.84)  hypothyroid prior to starting the L-T4.   At our last visit  she was on 1/2 of the 125 mcg Synthroid tablet/day.  She continues on this.  TSH was 2.17 on 5/22/17.    Past thyroid US 3/28/14 shows a heterogeneous gland consistent with Hashimoto thyroitiditis - without nodules.     DXA 9/20/16 showed lowest T-score -1.7 at the left femoral neck. I can't see the prior images to document change in BMD.    She has no history of bone fracture.  Her maximum adult height was 5'7.   Family history osteoporosis in her mother who fractured her hip around age 90.  Her father also fractured a hip aruond age 95.   "    MARIBELL   Feels good  Energy is OK  Sleep is good  Cardiac: negative  Respiratory: negative  GI: had colonoscopy yesterday; constipated;   No pains  No history of fractures.    Personal Hx   Behavioral history: No tobacco use.   Home environment: No secondhand tobacco smoke in home.    - Plays a lot of golf in the summer; she has no formal exercise otehrwise.     Milk 1 glass/day and in coffee  Yogurt qod  Cottage cheese weekly  Ice cream not often  Cheese daily    PMH   Past Medical History:   Diagnosis Date     Hashimoto's thyroiditis     goiter, subclinical hypothyroid     Papanicolaou smear of cervix with low grade squamous intraepithelial lesion (LGSIL) 2005 LSIL 05 [facility].  ECC-focal area of atypia Normal paps since     Post-menopausal      Rheumatoid arthritis, adult (H)     plaquenil     Past Surgical History:   Procedure Laterality Date     ARTHROSCOPY KNEE RT/LT      rt      SECTION       STRIP VEIN Bilateral      THORACOTOMY Left     congenital lung cyst     TONSILLECTOMY  age 3-4 years     Current Outpatient Prescriptions   Medication     SYNTHROID 125 MCG tablet     hydroxychloroquine (PLAQUENIL) 200 MG tablet     meloxicam (MOBIC) 15 MG tablet     No current facility-administered medications for this visit.      She is not taking calcium or vitamin D    Physical Exam   GENERAL: pleasant woman in no apparent distress.   /77 (BP Location: Right arm)  Pulse 69  Ht 1.67 m (5' 5.75\")  Wt 67.8 kg (149 lb 8 oz)  BMI 24.32 kg/m2  SKIN: normal color, normal temperature   HEENT: PER,  no scleral icterus, eyelid retraction, stare, lid lag, proptosis or conjunctival injection.   NECK: No visible neck masses, cervical adenopathy; thyroid is palpable, not enlarged.  LUNGS: clear to auscultation bilaterally    CARDIAC: RRR, S1, S2 without murmurs, rubs or gallops.   NEURO: Alert responds appropriately to questions, moves all extremities, DTRs 1/4; gait normal, no " tremor of the outstretched hand.   EXTREMITIES: No edema.     Results for JOSÉ MIGUEL TIMMONS (MRN [id number]) as of 4/10/2018 22:02   Ref. Range 4/10/2018 16:01   T4 Free Latest Ref Range: 0.76 - 1.46 ng/dL 0.96   TSH Latest Ref Range: 0.40 - 4.00 mU/L 1.89     ENDO THYROID LABS-P Latest Ref Rng & Units 5/22/2017 4/11/2016   TSH 0.40 - 4.00 mU/L 2.17 2.53   T4 FREE 0.76 - 1.46 ng/dL  0.96

## 2018-09-24 ENCOUNTER — TRANSFERRED RECORDS (OUTPATIENT)
Dept: HEALTH INFORMATION MANAGEMENT | Facility: CLINIC | Age: 68
End: 2018-09-24

## 2019-03-15 NOTE — PROGRESS NOTES
Danae is a 68 year old No obstetric history on file. female who presents for Medicare Limited exam.     Do you have a Health Care Directive?: No: Advance care planning was reviewed with patient; patient declined at this time.    Fall risk:   Fallen 2 or more times in the past year?: No  Any fall with injury in the past year?: No    HPI : The patient is seen at this time for her annual exam.  Her only health issue is new since hemorrhoids that flareup.  She has frequent colonoscopies by [facility] for multiple polyps but they have never approached the issue of her hemorrhoids.      GYNECOLOGIC HISTORY:  No LMP recorded. Patient is postmenopausal..   reports that  has never smoked. she has never used smokeless tobacco.    STD testing offered?  Declined  Last PHQ-9 score on record=   PHQ-9 SCORE 2017   PHQ-9 Total Score 0     Last GAD7 score on record=   CHANTELLE-7 SCORE 2016   Total Score 0 0       HEALTH MAINTENANCE:  Cholesterol:   Last Mammo: 2018, Result: normal, Next Mammo: 2019   Pap:   Lab Results   Component Value Date    PAP NIL HPV- 2017    PAP NIL 2016    PAP NIL 2015      DEXA:  16  Colonoscopy:  18, Result:  Polyps, diverticulosis, Next Colonoscopy:  years.    HISTORY:  Obstetric History     No data available        Past Medical History:   Diagnosis Date     Hashimoto's thyroiditis     goiter, subclinical hypothyroid     Papanicolaou smear of cervix with low grade squamous intraepithelial lesion (LGSIL) 2005 LSIL 05 [facility].  ECC-focal area of atypia Normal paps since     Post-menopausal      Rheumatoid arthritis, adult (H)     plaquenil     Past Surgical History:   Procedure Laterality Date     ARTHROSCOPY KNEE RT/LT      rt      SECTION       STRIP VEIN Bilateral      THORACOTOMY Left     congenital lung cyst     TONSILLECTOMY  age 5-4 years     Family History   Problem Relation Age of Onset      Osteoarthritis Other      Coronary Artery Disease No family hx of      Hypertension No family hx of      Social History     Socioeconomic History     Marital status:      Spouse name: Not on file     Number of children: Not on file     Years of education: Not on file     Highest education level: Not on file   Occupational History     Not on file   Social Needs     Financial resource strain: Not on file     Food insecurity:     Worry: Not on file     Inability: Not on file     Transportation needs:     Medical: Not on file     Non-medical: Not on file   Tobacco Use     Smoking status: Never Smoker     Smokeless tobacco: Never Used   Substance and Sexual Activity     Alcohol use: Yes     Alcohol/week: 1.2 - 1.8 oz     Types: 2 - 3 Standard drinks or equivalent per week     Drug use: No     Sexual activity: Yes     Partners: Male     Birth control/protection: Post-menopausal   Lifestyle     Physical activity:     Days per week: Not on file     Minutes per session: Not on file     Stress: Not on file   Relationships     Social connections:     Talks on phone: Not on file     Gets together: Not on file     Attends Rastafari service: Not on file     Active member of club or organization: Not on file     Attends meetings of clubs or organizations: Not on file     Relationship status: Not on file     Intimate partner violence:     Fear of current or ex partner: Not on file     Emotionally abused: Not on file     Physically abused: Not on file     Forced sexual activity: Not on file   Other Topics Concern     Parent/sibling w/ CABG, MI or angioplasty before 65F 55M? Not Asked   Social History Narrative     Not on file     Current Outpatient Medications   Medication Sig     hydroxychloroquine (PLAQUENIL) 200 MG tablet 200 mg 2 times daily     meloxicam (MOBIC) 15 MG tablet Take 1 tablet by mouth daily.     SYNTHROID 125 MCG tablet Take 1/2 tablet daily     No current facility-administered medications for this visit.       No Known Allergies    Past medical, surgical, social and family history were reviewed and updated in EPIC.    EXAM:  There were no vitals taken for this visit.   BMI: There is no height or weight on file to calculate BMI.    Constitutional: Appearance: Well nourished, well developed alert, in no acute distress  Breasts: Inspection of Breasts:  No lymphadenopathy present    Palpation of Breasts and Axillae:  No masses present on palpation, no  breast tenderness    Axillary Lymph Nodes:  No lymphadenopathy present  Neurologic/Psychiatric:    Mental Status:  Oriented X3     Pelvic Exam:  External Genitalia:     Normal appearance for age, no discharge present, no tenderness present, no inflammatory lesions present, color normal  Vagina:     Normal vaginal vault without central or paravaginal defects, ATROPHIC  Bladder:     Nontender to palpation  Urethra:   Urethral Body:  Urethra palpation normal, urethra structural support normal   Urethral Meatus:  No erythema or lesions present  Cervix:     Appearance healthy, no lesions present, nontender to palpation, no bleeding present  Uterus:     Nontender to palpation, no masses present, position anteflexed, mobility: normal  Adnexa:     No adnexal tenderness present, no adnexal masses present  Perineum:     Perineum within normal limits, no evidence of trauma, no rashes or skin lesions present  Inguinal Lymph Nodes:     No lymphadenopathy present      There is no height or weight on file to calculate BMI.  Weight management plan noted, stable and monitoring   reports that  has never smoked. she has never used smokeless tobacco.      ASSESSMENT:  68 year old female with satisfactory annual exam.    ICD-10-CM    1. Encounter for gynecological examination without abnormal finding Z01.419        COUNSELING:   Reviewed preventive health counseling, as reflected in patient instructions       Regular exercise       Healthy diet/nutrition    PLAN/PATIENT INSTRUCTIONS:    The  patient has a good general exam.  Her hemorrhoids are very irritated at this time and we have recommended seeing the colon rectal surgeons for possible banding.  She has flipped her mammograms to September and is up-to-date at this time.    Zeus Bryant MD

## 2019-03-19 ENCOUNTER — OFFICE VISIT (OUTPATIENT)
Dept: OBGYN | Facility: CLINIC | Age: 69
End: 2019-03-19
Payer: COMMERCIAL

## 2019-03-19 DIAGNOSIS — K64.4 EXTERNAL HEMORRHOIDS: ICD-10-CM

## 2019-03-19 DIAGNOSIS — Z01.419 ENCOUNTER FOR GYNECOLOGICAL EXAMINATION WITHOUT ABNORMAL FINDING: Primary | ICD-10-CM

## 2019-03-19 PROCEDURE — 99397 PER PM REEVAL EST PAT 65+ YR: CPT | Performed by: OBSTETRICS & GYNECOLOGY

## 2019-03-19 PROCEDURE — G0145 SCR C/V CYTO,THINLAYER,RESCR: HCPCS | Performed by: OBSTETRICS & GYNECOLOGY

## 2019-03-19 PROCEDURE — 87624 HPV HI-RISK TYP POOLED RSLT: CPT | Performed by: OBSTETRICS & GYNECOLOGY

## 2019-03-19 ASSESSMENT — ANXIETY QUESTIONNAIRES
1. FEELING NERVOUS, ANXIOUS, OR ON EDGE: NOT AT ALL
7. FEELING AFRAID AS IF SOMETHING AWFUL MIGHT HAPPEN: NOT AT ALL
3. WORRYING TOO MUCH ABOUT DIFFERENT THINGS: NOT AT ALL
5. BEING SO RESTLESS THAT IT IS HARD TO SIT STILL: NOT AT ALL
GAD7 TOTAL SCORE: 0
2. NOT BEING ABLE TO STOP OR CONTROL WORRYING: NOT AT ALL
6. BECOMING EASILY ANNOYED OR IRRITABLE: NOT AT ALL

## 2019-03-19 ASSESSMENT — PATIENT HEALTH QUESTIONNAIRE - PHQ9
SUM OF ALL RESPONSES TO PHQ QUESTIONS 1-9: 0
5. POOR APPETITE OR OVEREATING: NOT AT ALL

## 2019-03-19 NOTE — NURSING NOTE
Please abstract the following data from this visit with this patient into the appropriate field in Epic:    Mammogram done on this date: 9/24/18 (approximately), by this group: [facility], results were normal.

## 2019-03-19 NOTE — Clinical Note
Please abstract the following data from this visit with this patient into the appropriate field in Epic:Mammogram done on this date: 9/24/18 (approximately), by this group: [facility], results were normal.

## 2019-03-19 NOTE — NURSING NOTE
Please abstract the following data from this visit with this patient into the appropriate field in Epic:    Colonoscopy done on this date: 4/2018 (approximately), by this group: [facility], results were polyps-rpt 3 years.

## 2019-03-19 NOTE — Clinical Note
Please abstract the following data from this visit with this patient into the appropriate field in Epic:Colonoscopy done on this date: 4/2018 (approximately), by this group: [facility], results were polyps-rpt 3 years.

## 2019-03-19 NOTE — LETTER
March 26, 2019    Danae Wilson  [address] [facility] [address]    Dear ,  This letter is regarding your recent Pap smear (cervical cancer screening) and Human Papillomavirus (HPV) test.  We are happy to inform you that your Pap smear result is normal. Cervical cancer is closely linked with certain types of HPV. Your results showed no evidence of high-risk HPV.  We recommend you return in 1 year for your next pap smear.  You will still need to return to the clinic every year for an annual exam and other preventive tests.  If you have additional questions regarding this result, please call our registered nurse, Chuyita at [phone].  Sincerely,    Zeus Bryant MD/angela

## 2019-03-20 ASSESSMENT — ANXIETY QUESTIONNAIRES: GAD7 TOTAL SCORE: 0

## 2019-03-25 LAB
COPATH REPORT: NORMAL
PAP: NORMAL

## 2019-03-26 PROBLEM — Z12.4 CERVICAL CANCER SCREENING: Status: ACTIVE | Noted: 2017-10-03

## 2019-03-26 LAB
FINAL DIAGNOSIS: NORMAL
HPV HR 12 DNA CVX QL NAA+PROBE: NEGATIVE
HPV16 DNA SPEC QL NAA+PROBE: NEGATIVE
HPV18 DNA SPEC QL NAA+PROBE: NEGATIVE
SPECIMEN DESCRIPTION: NORMAL
SPECIMEN SOURCE CVX/VAG CYTO: NORMAL

## 2019-09-20 ASSESSMENT — ENCOUNTER SYMPTOMS
NECK PAIN: 0
ARTHRALGIAS: 1
NAIL CHANGES: 0
STIFFNESS: 1
MUSCLE WEAKNESS: 0
SKIN CHANGES: 0
JOINT SWELLING: 0
MYALGIAS: 0
BACK PAIN: 0
POOR WOUND HEALING: 0
MUSCLE CRAMPS: 0

## 2019-09-24 ENCOUNTER — OFFICE VISIT (OUTPATIENT)
Dept: ENDOCRINOLOGY | Facility: CLINIC | Age: 69
End: 2019-09-24
Payer: COMMERCIAL

## 2019-09-24 VITALS
BODY MASS INDEX: 24.32 KG/M2 | DIASTOLIC BLOOD PRESSURE: 76 MMHG | SYSTOLIC BLOOD PRESSURE: 121 MMHG | HEART RATE: 86 BPM | WEIGHT: 151.3 LBS | HEIGHT: 66 IN

## 2019-09-24 DIAGNOSIS — E04.9 GOITER: ICD-10-CM

## 2019-09-24 DIAGNOSIS — E06.3 HASHIMOTO'S THYROIDITIS: ICD-10-CM

## 2019-09-24 DIAGNOSIS — E03.8 SUBCLINICAL HYPOTHYROIDISM: ICD-10-CM

## 2019-09-24 DIAGNOSIS — M85.9 LOW BONE DENSITY: Primary | ICD-10-CM

## 2019-09-24 DIAGNOSIS — Z78.0 POST-MENOPAUSAL: ICD-10-CM

## 2019-09-24 LAB — TSH SERPL DL<=0.005 MIU/L-ACNC: 2.66 MU/L (ref 0.4–4)

## 2019-09-24 RX ORDER — LEVOTHYROXINE SODIUM 125 MCG
TABLET ORAL
Qty: 45 TABLET | Refills: 3 | Status: SHIPPED | OUTPATIENT
Start: 2019-09-24 | End: 2020-10-13

## 2019-09-24 ASSESSMENT — MIFFLIN-ST. JEOR: SCORE: 1238.04

## 2019-09-24 ASSESSMENT — PAIN SCALES - GENERAL: PAINLEVEL: NO PAIN (0)

## 2019-09-24 NOTE — PROGRESS NOTES
Endocrinology Attending Physician Progress note    Assessment   1. Hashimoto's thyroiditis with history of goiter and subclinical (borderline) hypothyroidism- She is clinically euthyroid. Goiter is resolved in exam today.  Labs today, following the appt, are normal.    She continues to require once/year follow up TFTS with thyroid exam, or sooner prn.  She may return to her PCP for this and see us prn, or she may return in one year.  Refilled Synthroid.      2.  Low bone density , lowest T-score -1.7. Height loss 1 inch.  FRAX 17/1.2.   She is due for follow up DXA,  Discussed.     Post menopausal     Need for primary care is apparent.      Franchesca Szymanski MD   Endocrinology & Diabetes    HPI   Danae is a 69 year old woman who returns for follow up related to goiter and Hashimoto thyroiditis with borderline subclinical hypothyroidism. She was last seen by me 4/18.     I have been seeing her about once/year since 2006. She had initially reported ? Goiter had been  present since ? College .  The thyroid was 1.5-2 times normal in size at our lst meeting.  I started her on L-T4 2/06. She was not biochemically (highest TSH 2.84)  hypothyroid prior to starting the L-T4.   At our last visit  she was on 1/2 of the 125 mcg Synthroid tablet/day.  She continues on this. Labs on 4/10/18 showed TSH 1.89, free T4 0.96 .    Past thyroid US 3/28/14 shows a heterogeneous gland consistent with Hashimoto thyroitiditis - without nodules.     DXA 9/20/16 showed lowest T-score -1.7 at the left femoral neck. I can't see the prior images to document change in BMD.    She has no history of bone fracture.  Her maximum adult height was 5'7.   Family history osteoporosis in her mother who fractured her hip around age 90.  Her father also fractured a hip aruond age 95.      ROS   Maximum adult height 5'7+;   sometimse back pain  Plaquenil is from her rheumatologist. She says she doesn't have RA but it is for her hands.    Back feels better  when she golfs    Answers for HPI/ROS submitted by the patient on 2019   General Symptoms: No  Skin Symptoms: Yes  HENT Symptoms: No  EYE SYMPTOMS: No  HEART SYMPTOMS: No  LUNG SYMPTOMS: No  INTESTINAL SYMPTOMS: No  URINARY SYMPTOMS: No  GYNECOLOGIC SYMPTOMS: No  BREAST SYMPTOMS: No  SKELETAL SYMPTOMS: Yes  BLOOD SYMPTOMS: No  NERVOUS SYSTEM SYMPTOMS: No  MENTAL HEALTH SYMPTOMS: No  Changes in hair: No  Changes in moles/birth marks: No  Itching: Yes  Rashes: Yes  Changes in nails: No  Acne: No  Hair in places you don't want it: No  Change in facial hair: No  Warts: No  Non-healing sores: No  Scarring: No  Flaking of skin: No  Color changes of hands/feet in cold : No  Sun sensitivity: No  Skin thickening: No  Back pain: No  Muscle aches: No  Neck pain: No  Swollen joints: No  Joint pain: Yes  Bone pain: No  Muscle cramps: No  Muscle weakness: No  Joint stiffness: Yes  Bone fracture: No    Personal Hx   Behavioral history: No tobacco use.   Home environment: No secondhand tobacco smoke in home.   Still golfing regularly; diet includes lots ofdairy    PMH   Past Medical History:   Diagnosis Date     Hashimoto's thyroiditis     goiter, subclinical hypothyroid     Papanicolaou smear of cervix with low grade squamous intraepithelial lesion (LGSIL) 2005 LSIL 05 [facility].  ECC-focal area of atypia Normal paps since     Post-menopausal      Rheumatoid arthritis, adult (H)     plaquenil     Past Surgical History:   Procedure Laterality Date     ARTHROSCOPY KNEE RT/LT      rt      SECTION       STRIP VEIN Bilateral      THORACOTOMY Left     congenital lung cyst     TONSILLECTOMY  age 3-4 years     Current Outpatient Medications   Medication Sig Dispense Refill     hydroxychloroquine (PLAQUENIL) 200 MG tablet 200 mg 2 times daily       lifitegrast (XIIDRA) 5 % opthalmic solution        meloxicam (MOBIC) 15 MG tablet Take 1 tablet by mouth daily.       SYNTHROID 125 MCG tablet Take 1/2  "tablet daily 45 tablet 3       Physical Exam   GENERAL: pleasant woman in no apparent distress.   /76   Pulse 86   Ht 1.676 m (5' 6\")   Wt 68.6 kg (151 lb 4.8 oz)   BMI 24.42 kg/m    SKIN: normal color, normal temperature   HEENT: PER,  no scleral icterus, eyelid retraction, stare, lid lag, proptosis or conjunctival injection.   NECK: No visible neck masses, cervical adenopathy; thyroid isnot palpable.   LUNGS: clear to auscultation bilaterally    CARDIAC: RRR, S1, S2 without murmurs, rubs or gallops.   NEURO: Alert responds appropriately to questions, moves all extremities, DTRs 1/4; gait normal, no tremor of the outstretched hand.   EXTREMITIES: No edema.       "

## 2019-09-24 NOTE — PATIENT INSTRUCTIONS
DXA bone density test - to be done at [facility] - this is the only way it can be compared . OR  You can get it here.     Labs    CALCIUM Recommendation 1200 mg/day in divided dose of no more than 500 mg/dose. This includes what is in your food and also what is in any supplements you are taking.      Dietary sources of calcium: These also contain vitamin D  Milk / buttermilk              8 oz            300 mg  Dry milk powder       5 Tbsp             300 mg  Yogurt                          1 cup           400 mg  Ice cream   1/2 cup          100 mg  Hard cheese                     1.5 oz          300 mg  Cottage cheese                1/2 cup        65 mg  Spinach, cooked  1 cup  240 mg  Tofu, soft regular           4 oz          120 to 390 mg  Sardines                       3 oz               370 mg  Mackerel canned         3 oz                250 mg  Canned salmon with bones 3 oz        170-210 mg  Calcium fortified cereals are available  SILK soy and almond milk products are calcium fortified  Orange juice  Fortified with Calcium       8 oz            300 mg  Low fat dairy sources are recommended      Recommended exercise goals:    30 minutes of moderate exercise on most days of the week .  Weight bearing exercise (ie, walking, jogging, hiking, dancing)    Strength training 2 or more times/week in addition to other weight -being exercise.  Strength training -- uses weight or resistance beyond that seen in everyday activities -(pilates, weight training with free weights, weight machines or resistance bands)

## 2019-09-24 NOTE — LETTER
9/24/2019       RE: Danae Wilson  [address]  [facility] [address]     Dear Colleague,    Thank you for referring your patient, Danae Wilson, to the [facility] ENDOCRINOLOGY at [facility]. Please see a copy of my visit note below.    Endocrinology Attending Physician Progress note    Assessment   1. Hashimoto's thyroiditis with history of goiter and subclinical (borderline) hypothyroidism- She is clinically euthyroid. Goiter is resolved in exam today.  Labs today, following the appt, are normal.    She continues to require once/year follow up TFTS with thyroid exam, or sooner prn.  She may return to her PCP for this and see us prn, or she may return in one year.  Refilled Synthroid.      2.  Low bone density , lowest T-score -1.7. Height loss 1 inch.  FRAX 17/1.2.   She is due for follow up DXA,  Discussed.     Post menopausal     Need for primary care is apparent.      Franchesca Szymanski MD   Endocrinology & Diabetes    HPI   Danae is a 69 year old woman who returns for follow up related to goiter and Hashimoto thyroiditis with borderline subclinical hypothyroidism. She was last seen by me 4/18.     I have been seeing her about once/year since 2006. She had initially reported ? Goiter had been  present since ? College .  The thyroid was 1.5-2 times normal in size at our lst meeting.  I started her on L-T4 2/06. She was not biochemically (highest TSH 2.84)  hypothyroid prior to starting the L-T4.   At our last visit  she was on 1/2 of the 125 mcg Synthroid tablet/day.  She continues on this. Labs on 4/10/18 showed TSH 1.89, free T4 0.96 .    Past thyroid US 3/28/14 shows a heterogeneous gland consistent with Hashimoto thyroitiditis - without nodules.     DXA 9/20/16 showed lowest T-score -1.7 at the left femoral neck. I can't see the prior images to document change in BMD.    She has no history of bone fracture.  Her maximum adult height was 5'7.   Family history  osteoporosis in her mother who fractured her hip around age 90.  Her father also fractured a hip aruond age 95.      ROS   Maximum adult height 5'7+;   sometimse back pain  Plaquenil is from her rheumatologist. She says she doesn't have RA but it is for her hands.    Back feels better when she golfs    Answers for HPI/ROS submitted by the patient on 2019   General Symptoms: No  Skin Symptoms: Yes  HENT Symptoms: No  EYE SYMPTOMS: No  HEART SYMPTOMS: No  LUNG SYMPTOMS: No  INTESTINAL SYMPTOMS: No  URINARY SYMPTOMS: No  GYNECOLOGIC SYMPTOMS: No  BREAST SYMPTOMS: No  SKELETAL SYMPTOMS: Yes  BLOOD SYMPTOMS: No  NERVOUS SYSTEM SYMPTOMS: No  MENTAL HEALTH SYMPTOMS: No  Changes in hair: No  Changes in moles/birth marks: No  Itching: Yes  Rashes: Yes  Changes in nails: No  Acne: No  Hair in places you don't want it: No  Change in facial hair: No  Warts: No  Non-healing sores: No  Scarring: No  Flaking of skin: No  Color changes of hands/feet in cold : No  Sun sensitivity: No  Skin thickening: No  Back pain: No  Muscle aches: No  Neck pain: No  Swollen joints: No  Joint pain: Yes  Bone pain: No  Muscle cramps: No  Muscle weakness: No  Joint stiffness: Yes  Bone fracture: No    Personal Hx   Behavioral history: No tobacco use.   Home environment: No secondhand tobacco smoke in home.   Still golfing regularly; diet includes lots ofdairy    PMH   Past Medical History:   Diagnosis Date     Hashimoto's thyroiditis     goiter, subclinical hypothyroid     Papanicolaou smear of cervix with low grade squamous intraepithelial lesion (LGSIL) 2005 LSIL 05 [facility].  ECC-focal area of atypia Normal paps since     Post-menopausal      Rheumatoid arthritis, adult (H)     plaquenil     Past Surgical History:   Procedure Laterality Date     ARTHROSCOPY KNEE RT/LT      rt      SECTION       STRIP VEIN Bilateral      THORACOTOMY Left     congenital lung cyst     TONSILLECTOMY  age 3-4 years     Current  "Outpatient Medications   Medication Sig Dispense Refill     hydroxychloroquine (PLAQUENIL) 200 MG tablet 200 mg 2 times daily       lifitegrast (XIIDRA) 5 % opthalmic solution        meloxicam (MOBIC) 15 MG tablet Take 1 tablet by mouth daily.       SYNTHROID 125 MCG tablet Take 1/2 tablet daily 45 tablet 3       Physical Exam   GENERAL: pleasant woman in no apparent distress.   /76   Pulse 86   Ht 1.676 m (5' 6\")   Wt 68.6 kg (151 lb 4.8 oz)   BMI 24.42 kg/m     SKIN: normal color, normal temperature   HEENT: PER,  no scleral icterus, eyelid retraction, stare, lid lag, proptosis or conjunctival injection.   NECK: No visible neck masses, cervical adenopathy; thyroid isnot palpable.   LUNGS: clear to auscultation bilaterally    CARDIAC: RRR, S1, S2 without murmurs, rubs or gallops.   NEURO: Alert responds appropriately to questions, moves all extremities, DTRs 1/4; gait normal, no tremor of the outstretched hand.   EXTREMITIES: No edema.         Again, thank you for allowing me to participate in the care of your patient.      Sincerely,    Franchesca Szymanski MD      "

## 2019-09-24 NOTE — LETTER
Date:September 25, 2019      Patient was self referred, no letter generated. Do not send.        [facility] Health Information

## 2019-11-07 ENCOUNTER — HEALTH MAINTENANCE LETTER (OUTPATIENT)
Age: 69
End: 2019-11-07

## 2020-02-17 ENCOUNTER — HEALTH MAINTENANCE LETTER (OUTPATIENT)
Age: 70
End: 2020-02-17

## 2020-10-06 ENCOUNTER — OFFICE VISIT (OUTPATIENT)
Dept: OBGYN | Facility: CLINIC | Age: 70
End: 2020-10-06
Payer: COMMERCIAL

## 2020-10-06 VITALS
SYSTOLIC BLOOD PRESSURE: 106 MMHG | HEART RATE: 74 BPM | BODY MASS INDEX: 23.5 KG/M2 | HEIGHT: 66 IN | DIASTOLIC BLOOD PRESSURE: 54 MMHG | WEIGHT: 146.2 LBS

## 2020-10-06 DIAGNOSIS — Z01.419 ENCOUNTER FOR GYNECOLOGICAL EXAMINATION WITHOUT ABNORMAL FINDING: Primary | ICD-10-CM

## 2020-10-06 PROCEDURE — 87624 HPV HI-RISK TYP POOLED RSLT: CPT | Performed by: OBSTETRICS & GYNECOLOGY

## 2020-10-06 PROCEDURE — G0145 SCR C/V CYTO,THINLAYER,RESCR: HCPCS | Performed by: OBSTETRICS & GYNECOLOGY

## 2020-10-06 PROCEDURE — 99397 PER PM REEVAL EST PAT 65+ YR: CPT | Performed by: OBSTETRICS & GYNECOLOGY

## 2020-10-06 ASSESSMENT — ANXIETY QUESTIONNAIRES
GAD7 TOTAL SCORE: 0
5. BEING SO RESTLESS THAT IT IS HARD TO SIT STILL: NOT AT ALL
2. NOT BEING ABLE TO STOP OR CONTROL WORRYING: NOT AT ALL
IF YOU CHECKED OFF ANY PROBLEMS ON THIS QUESTIONNAIRE, HOW DIFFICULT HAVE THESE PROBLEMS MADE IT FOR YOU TO DO YOUR WORK, TAKE CARE OF THINGS AT HOME, OR GET ALONG WITH OTHER PEOPLE: NOT DIFFICULT AT ALL
3. WORRYING TOO MUCH ABOUT DIFFERENT THINGS: NOT AT ALL
6. BECOMING EASILY ANNOYED OR IRRITABLE: NOT AT ALL
7. FEELING AFRAID AS IF SOMETHING AWFUL MIGHT HAPPEN: NOT AT ALL
1. FEELING NERVOUS, ANXIOUS, OR ON EDGE: NOT AT ALL

## 2020-10-06 ASSESSMENT — PATIENT HEALTH QUESTIONNAIRE - PHQ9
5. POOR APPETITE OR OVEREATING: NOT AT ALL
SUM OF ALL RESPONSES TO PHQ QUESTIONS 1-9: 0

## 2020-10-06 ASSESSMENT — MIFFLIN-ST. JEOR: SCORE: 1201.97

## 2020-10-06 NOTE — PROGRESS NOTES
Danae is a 70 year old No obstetric history on file. female who presents for annual exam.     Besides routine health maintenance, she has no other health concerns today .    Do you have a Health Care Directive?: No, advance care planning information given to patient to review.  Patient plans to discuss their wishes with loved ones or provider.      Fall risk:   Fallen 2 or more times in the past year?: No  Any fall with injury in the past year?: No    HPI: The patient is seen at this time for her annual exam.  She is postmenopausal and very physically active.  She has no change in her health history at this time.  The patient's PCP is  No primary care provider on file.    GYNECOLOGIC HISTORY:  No LMP recorded. Patient is postmenopausal..   reports that she has never smoked. She has never used smokeless tobacco.    Patient is sexually active.  STD testing offered?  Declined  Last PHQ-9 score on record=   PHQ-9 SCORE 10/6/2020   PHQ-9 Total Score 0     Last GAD7 score on record=   CHANTELLE-7 SCORE 9/25/2017 3/19/2019 10/6/2020   Total Score 0 0 0     Alcohol Score = 3    HEALTH MAINTENANCE:  Cholesterol: 04/02/2015   Total= 191, Triglycerides=82, HDL=74, EIR=536,   Last Mammo: 09/24/2018, Result: Normal, Next Mammo: Due   Pap:   Lab Results   Component Value Date    PAP NIL 03/19/2019    PAP NIL 09/25/2017    PAP NIL 09/20/2016      DEXA: 09/20/2016    FINDINGS:               Lumbar Spine (L1-L4) T-score:  -1.1               Left Femoral Neck T-score:  -1.7               Right Femoral Neck T-score:  -1.4               Forearm (radius 33%) T-score:       Lumbar (L1-L4) BMD: 1.082   Previous: 1.242             Total Hip Mean BMD: 0.841   Previous: 0.896    Colonoscopy:  04/09/2018, Result:  Abnormal, Next Colonoscopy: Scheduled for November.    Health maintenance updated:  yes    HISTORY:  OB History   No obstetric history on file.     Patient Active Problem List   Diagnosis     Subclinical hypothyroidism     Fatigue      "Hashimoto's thyroiditis     Goiter     Cervical cancer screening     Past Surgical History:   Procedure Laterality Date     ARTHROSCOPY KNEE RT/LT  2012    rt      SECTION  1983     STRIP VEIN Bilateral      THORACOTOMY Left     congenital lung cyst     TONSILLECTOMY  age 5-4 years      Social History     Tobacco Use     Smoking status: Never Smoker     Smokeless tobacco: Never Used   Substance Use Topics     Alcohol use: Yes     Alcohol/week: 2.0 - 3.0 standard drinks     Types: 2 - 3 Standard drinks or equivalent per week      Problem (# of Occurrences) Relation (Name,Age of Onset)    Osteoarthritis (1) Other (mult)       Negative family history of: Coronary Artery Disease, Hypertension            Current Outpatient Medications   Medication Sig     hydroxychloroquine (PLAQUENIL) 200 MG tablet 200 mg 2 times daily     lifitegrast (XIIDRA) 5 % opthalmic solution      meloxicam (MOBIC) 15 MG tablet Take 1 tablet by mouth daily.     SYNTHROID 125 MCG tablet Take 1/2 tablet daily     No current facility-administered medications for this visit.        No Known Allergies    Past medical, surgical, social and family history were reviewed and updated in EPIC.    ROS:   12 point review of systems negative other than symptoms noted below or in the HPI.  No urinary frequency or dysuria, bladder or kidney problems    EXAM:  /54   Pulse 74   Ht 1.664 m (5' 5.5\")   Wt 66.3 kg (146 lb 3.2 oz)   Breastfeeding No   BMI 23.96 kg/m     BMI: Body mass index is 23.96 kg/m .    EXAM:  Constitutional: Appearance: Well nourished, well developed alert, in no acute distress  Neck:  Lymph Nodes:  No lymphadenopathy present    Thyroid:  Gland size normal, nontender, no nodules or masses present  on palpation  Chest:  Respiratory Effort:  Breathing unlabored  Cardiovascular:Heart    Auscultation:  Regular rate, normal rhythm, no murmurs present  Breasts: Inspection of Breasts:  No lymphadenopathy present., Palpation of " Breasts and Axillae:  No masses present on palpation, no breast tenderness., Axillary Lymph Nodes:  No lymphadenopathy present. and No nodularity, asymmetry or nipple discharge bilaterally.  Gastrointestinal:  Abdominal Examination:  Abdomen nontender to palpation, tone normal without     rigidity or guarding, no masses present, umbilicus without lesions    Liver and speen:  No hepatomegaly present, liver nontender to palpation    Hernias:  No hernias present  Lymphatic: Lymph Nodes:  No other lymphadenopathy present  Skin:  General Inspection:  No rashes present, no lesions present, no areas of  discoloration.    Genitalia and Groin:  No rashes present, no lesions present, no areas of  discoloration, no masses present  Neurologic/Psychiatric:    Mental Status:  Oriented X3     Pelvic Exam:  External Genitalia:     Normal appearance for age, no discharge present, no tenderness present, no inflammatory lesions present, color normal  Vagina:     Normal vaginal vault without central or paravaginal defects, ATROPHIC  Bladder:     Nontender to palpation  Urethra:   Urethral Body:  Urethra palpation normal, urethra structural support normal   Urethral Meatus:  No erythema or lesions present  Cervix:     Appearance healthy, no lesions present, nontender to palpation, no bleeding present  Uterus:     Nontender to palpation, no masses present, position anteflexed, mobility: normal  Adnexa:     No adnexal tenderness present, no adnexal masses present  Perineum:     Perineum within normal limits, no evidence of trauma, no rashes or skin lesions present  Inguinal Lymph Nodes:     No lymphadenopathy present      COUNSELING:   Reviewed preventive health counseling, as reflected in patient instructions       Regular exercise       Healthy diet/nutrition    BMI:  Body mass index is 23.96 kg/m .     reports that she has never smoked. She has never used smokeless tobacco.      ASSESSMENT:  70 year old female with satisfactory annual  exam.    ICD-10-CM    1. Encounter for gynecological examination without abnormal finding  Z01.419        PLAN: The patient is doing well at this time.  She will return for mammogram within the next month.      Zeus Bryant MD

## 2020-10-06 NOTE — LETTER
October 16, 2020      Danae Wilson  [address] [facility] [address]        Dear ,    We are happy to inform you that your recent Pap smear and Human Papillomavirus (HPV) test results are normal and negative.    Per current guidelines, you no longer need to have Pap smears completed. Please continue to be seen every year for annual wellness visits.    If you have additional questions regarding this result, please contact our office and we will be happy to assist you.      Sincerely,    Your [facility] Care Team

## 2020-10-07 ASSESSMENT — ANXIETY QUESTIONNAIRES: GAD7 TOTAL SCORE: 0

## 2020-10-09 DIAGNOSIS — E03.8 SUBCLINICAL HYPOTHYROIDISM: ICD-10-CM

## 2020-10-09 DIAGNOSIS — E04.9 GOITER: ICD-10-CM

## 2020-10-09 DIAGNOSIS — E06.3 HASHIMOTO'S THYROIDITIS: ICD-10-CM

## 2020-10-12 LAB
COPATH REPORT: NORMAL
PAP: NORMAL

## 2020-10-13 NOTE — TELEPHONE ENCOUNTER
Synthroid 125 mcg tablet      Last Written Prescription Date:  9/24/19  Last Fill Quantity: 45,   # refills: 3  Last Office Visit : 9/24/19  Future Office visit:  1/5/21    Routing refill request to provider for review/approval because:  Lab Test 09/24/19  1636   TSH 2.66   Provider preference

## 2020-10-14 RX ORDER — LEVOTHYROXINE SODIUM 125 MCG
TABLET ORAL
Qty: 45 TABLET | Refills: 4 | Status: SHIPPED | OUTPATIENT
Start: 2020-10-14

## 2020-11-29 ENCOUNTER — HEALTH MAINTENANCE LETTER (OUTPATIENT)
Age: 70
End: 2020-11-29

## 2021-01-01 NOTE — PROGRESS NOTES
Danae Wilson is a 70 year old female who is being evaluated via a billable telephone visit.      What phone number would you like to be contacted at? [phone]  How would you like to obtain your AVS? Mail a copy     Leslie Reid MA

## 2021-01-05 ENCOUNTER — VIRTUAL VISIT (OUTPATIENT)
Dept: ENDOCRINOLOGY | Facility: CLINIC | Age: 71
End: 2021-01-05
Payer: COMMERCIAL

## 2021-01-05 DIAGNOSIS — E06.3 HASHIMOTO'S THYROIDITIS: ICD-10-CM

## 2021-01-05 DIAGNOSIS — E03.8 SUBCLINICAL HYPOTHYROIDISM: ICD-10-CM

## 2021-01-05 DIAGNOSIS — M85.9 LOW BONE DENSITY: Primary | ICD-10-CM

## 2021-01-05 PROCEDURE — 99214 OFFICE O/P EST MOD 30 MIN: CPT | Mod: 95

## 2021-01-05 NOTE — PROGRESS NOTES
Endocrinology Progress note    Assessment   1. Hashimoto's thyroiditis with history of goiter and subclinical (borderline) hypothyroidism- She is clinically euthyroid.   She continues to require once/year follow up TFTS with thyroid exam, or sooner prn.  She may return to her PCP for this and see us prn, or she may return in one year.    2.  Low bone density , lowest T-score -1.7. Height loss 1 inch.  FRAX 17/1.2.   She is due for follow up DXA,      Addendum: 1/11/2021 DXA lowest T-score -1.8 left femoral neck; study can't be compared to past due to being different machine/location.     Post menopausal      Due to the COVID 19 pandemic this visit was a telephone/video visit in order to help prevent spread of infection in this high risk patient and the general population. The patient gave verbal consent for the visit today.    Start time 1524 doximity invite.    Stop time  1530  Total time 6 minutes     Franchesca Szymanski MD   Endocrinology & Diabetes    HPI   Danae is a 70 year old woman who returns for follow up related to goiter and Hashimoto thyroiditis with borderline subclinical hypothyroidism. She was last seen by me 9/2019.     I have been seeing her about once/year since 2006. She had initially reported ? Goiter had been  present since ? College .  The thyroid was 1.5-2 times normal in size at our lst meeting.  I started her on L-T4 2/06. She was not biochemically (highest TSH 2.84)  hypothyroid prior to starting the L-T4.   At our last visit  she was on 1/2 of the 125 mcg Synthroid tablet/day.  She continues on this. Labs on 9/24/19 showed TSH 2.66 .  I ordered DXA last year.  It has not been done .    Past thyroid US 3/28/14 shows a heterogeneous gland consistent with Hashimoto thyroitiditis - without nodules.     DXA 9/20/16 showed lowest T-score -1.7 at the left femoral neck. Today she says she can't get the follow up dXA at this location since the office moved.   She has no history of bone fracture.   Her maximum adult height was 5'7.   Family history osteoporosis in her mother who fractured her hip around age 90.  Her father also fractured a hip aruond age 95.      ROS   Feels well  No throat/swallow or voice symptoms  Negative cardiac, respiratory, GI  Had recent mammogram and colonoscopy    PM   Past Medical History:   Diagnosis Date     Hashimoto's thyroiditis     goiter, subclinical hypothyroid     Papanicolaou smear of cervix with low grade squamous intraepithelial lesion (LGSIL) 2005 LSIL 05 [facility].  ECC-focal area of atypia Normal paps since     Post-menopausal      Rheumatoid arthritis, adult (H)     plaquenil     Past Surgical History:   Procedure Laterality Date     ARTHROSCOPY KNEE RT/LT      rt      SECTION       STRIP VEIN Bilateral      THORACOTOMY Left     congenital lung cyst     TONSILLECTOMY  age 3-4 years     Current Outpatient Medications   Medication Sig Dispense Refill     hydroxychloroquine (PLAQUENIL) 200 MG tablet 200 mg 2 times daily       lifitegrast (XIIDRA) 5 % opthalmic solution        meloxicam (MOBIC) 15 MG tablet Take 1 tablet by mouth daily.       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   There were no vitals taken for this visit.   BP Readings from Last 1 Encounters:   10/06/20 106/54      Pulse Readings from Last 1 Encounters:  "  10/06/20 74      Resp Readings from Last 1 Encounters:   No data found for Resp      Temp Readings from Last 1 Encounters:   No data found for Temp      SpO2 Readings from Last 1 Encounters:   No data found for SpO2      Wt Readings from Last 1 Encounters:   10/06/20 66.3 kg (146 lb 3.2 oz)      Ht Readings from Last 1 Encounters:   10/06/20 1.664 m (5' 5.5\")     GENERAL: pleasant woman in no distress  SKIN: Visible skin clear. No significant rash, abnormal pigmentation or lesions.  EYES: Eyes grossly normal to inspection.  No discharge or erythema, or obvious scleral/conjunctival abnormalities.  NECK: no visible masses; no grossly visible goiter  RESP: No audible wheeze, cough, or visible cyanosis.  No visible retractions or increased work of breathing.    NEURO:.  Awake, alert, responds appropriately to questions.  Mentation and speech fluent.  PSYCH:affect normal, judgement and insight intact, and appearance well-groomed.    Results for DANAE WILSON (MRN [id number]) as of 2021 19:22   Ref. Range 4/10/2018 16:01 3/19/2019 15:50 2019 16:36   T4 Free Latest Ref Range: 0.76 - 1.46 ng/dL 0.96     TSH Latest Ref Range: 0.40 - 4.00 mU/L 1.89  2.66     BONE DENSITOMETRY  [address] [address]  2021      PATIENT: Danae Wilson  CHART: [id number]   :  1950  AGE:  70 year old  SEX:  female   REFERRING PROVIDER:  Franchesca Szymanski MD     PROCEDURE:  Bone density scanning was performed using DXA technology of the lumbar spine and hip.  Scanning was performed on a [employer] scanner.  Reporting is completed in the form of a T-score.  The T-score represents the standard deviation from peak bone mass based on a young healthy adult.     REFERENCE T-SCORES:       Normal                -1.0 and greater                                 Osteopenia         Between -1.0 and -2.5                                           Osteoporosis     -2.5 and less " "                                      RISK FACTORS:  Post-menopausal, follow up osteopenia     CURRENT TREATMENT:  Vitamin D     FINDINGS:               Lumbar Spine (L1-L2)      T-score:  -1.5, marked degenerative changes present at L3,4, so only L1,2 are evaluated.                Left Femoral Neck            T-score:  -1.8               Right Femoral Neck          T-score:  -1.5                        Lumbar (L1-L2) BMD: 0.987  Previous: 1.024 (the previous image was mismarked, L2-3 on the previous are the same as L1-2 on the current study so these are the vertebra compared)                          Total Hip Mean BMD: 0.841   Previous: 0.841     Comparison is made to another DXA performed on a different [employer] machine on 9/20/2016.      IMPRESSION  Osteopenia.     Comparisons from different scanners that have not been cross calibrated, are not necessarily valid. Such a comparison has been performed here; one should interpret with caution.   There has been probably no significant change in bone density of the lumbar spine. There has been probably no significant change in bone density of the hip(s).      Recommendations include ensuring adequate Calcium and Vitamin D.     The current NOF Guidelines recommend treatment for patients with prior hip or vertebral fracture, T-score -2.5 or below, or 10 year risk of any major osteoporotic fracture >20% or 10 year risk of hip fracture >3%, as calculated using the FRAX calculator (www.shef.ac.uk/FRAX or you can google \"FRAX\").       This patient's risks based on available information, with the use of FRAX, are 9/8 % for major osteoporotic fracture and 1.5 % for hip fracture.   Based on these guidelines, treatment (in addition to calcium and vitamin D) is not recommended for this patient, after ruling out other causes of osteoporosis.  This is meant as an aid to clinical decision making; one must still use clinical judgement.        Follow up can be considered in 5 years. "   ___________________  Angela Norton M.D.  Electronically signed

## 2021-01-05 NOTE — LETTER
1/5/2021       RE: Danae Wilson  [address]  [facility] [address]     Dear Colleague,    Thank you for referring your patient, Danae Wilson, to the [facility] [place] [facility]. Please see a copy of my visit note below.    Danae Wilson is a 70 year old female who is being evaluated via a billable telephone visit.      What phone number would you like to be contacted at? [phone]  How would you like to obtain your AVS? Mail a copy     Leslie Reid MA      Endocrinology Progress note    Assessment   1. Hashimoto's thyroiditis with history of goiter and subclinical (borderline) hypothyroidism- She is clinically euthyroid.   She continues to require once/year follow up TFTS with thyroid exam, or sooner prn.  She may return to her PCP for this and see us prn, or she may return in one year.    2.  Low bone density , lowest T-score -1.7. Height loss 1 inch.  FRAX 17/1.2.   She is due for follow up DXA,      Post menopausal      Due to the COVID 19 pandemic this visit was a telephone/video visit in order to help prevent spread of infection in this high risk patient and the general population. The patient gave verbal consent for the visit today.    Start time 1524 doximity invite.    Stop time  1530  Total time 6 minutes     Franchesca Szymanski MD   Endocrinology & Diabetes    HPI   Danae is a 70 year old woman who returns for follow up related to goiter and Hashimoto thyroiditis with borderline subclinical hypothyroidism. She was last seen by me 9/2019.     I have been seeing her about once/year since 2006. She had initially reported ? Goiter had been  present since ? College .  The thyroid was 1.5-2 times normal in size at our lst meeting.  I started her on L-T4 2/06. She was not biochemically (highest TSH 2.84)  hypothyroid prior to starting the L-T4.   At our last visit  she was on 1/2 of the 125 mcg Synthroid tablet/day.  She  continues on this. Labs on 19 showed TSH 2.66 .  I ordered DXA last year.  It has not been done .    Past thyroid US 3/28/14 shows a heterogeneous gland consistent with Hashimoto thyroitiditis - without nodules.     DXA 16 showed lowest T-score -1.7 at the left femoral neck. Today she says she can't get the follow up dXA at this location since the office moved.   She has no history of bone fracture.  Her maximum adult height was 5'7.   Family history osteoporosis in her mother who fractured her hip around age 90.  Her father also fractured a hip aruond age 95.      ROS   Feels well  No throat/swallow or voice symptoms  Negative cardiac, respiratory, GI  Had recent mammogram and colonoscopy    PMH   Past Medical History:   Diagnosis Date     Hashimoto's thyroiditis     goiter, subclinical hypothyroid     Papanicolaou smear of cervix with low grade squamous intraepithelial lesion (LGSIL) 2005 LSIL 05 [facility].  ECC-focal area of atypia Normal paps since     Post-menopausal      Rheumatoid arthritis, adult (H)     plaquenil     Past Surgical History:   Procedure Laterality Date     ARTHROSCOPY KNEE RT/LT      rt      SECTION       STRIP VEIN Bilateral      THORACOTOMY Left     congenital lung cyst     TONSILLECTOMY  age 3-4 years     Current Outpatient Medications   Medication Sig Dispense Refill     hydroxychloroquine (PLAQUENIL) 200 MG tablet 200 mg 2 times daily       lifitegrast (XIIDRA) 5 % opthalmic solution        meloxicam (MOBIC) 15 MG tablet Take 1 tablet by mouth daily.       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   There were no vitals taken for this visit.   BP Readings from Last 1 Encounters:   10/06/20 106/54      Pulse Readings from Last 1 Encounters:   10/06/20 74      Resp Readings from Last 1 Encounters:   No data found for Resp      Temp Readings from Last 1 Encounters:   No data found for Temp      SpO2 Readings from Last 1 Encounters:   No data found for SpO2      Wt Readings from Last 1 Encounters:   10/06/20 66.3 kg (146 lb 3.2 oz)      Ht Readings from Last 1 Encounters:   10/06/20 1.664 m (5' 5.5\")     GENERAL: pleasant woman in no distress  SKIN: Visible skin clear. No significant rash, abnormal pigmentation or lesions.  EYES: Eyes grossly normal to inspection.  No discharge or erythema, or obvious scleral/conjunctival abnormalities.  NECK: no visible masses; no grossly visible goiter  RESP: No audible wheeze, cough, or visible cyanosis.  No visible retractions or increased work of breathing.    NEURO:.  Awake, alert, responds appropriately to questions.  Mentation and speech fluent.  PSYCH:affect normal, judgement and insight intact, and appearance well-groomed.    Results for JOSÉ MIGUEL TIMMONS (MRN [id number]) as of 1/5/2021 19:22   Ref. Range 4/10/2018 16:01 3/19/2019 15:50 9/24/2019 16:36   T4 Free Latest Ref Range: 0.76 - 1.46 ng/dL 0.96     TSH Latest Ref Range: 0.40 - 4.00 mU/L 1.89  2.66         Franchesca Szymanski MD    "

## 2021-01-11 ENCOUNTER — ANCILLARY PROCEDURE (OUTPATIENT)
Dept: BONE DENSITY | Facility: CLINIC | Age: 71
End: 2021-01-11
Payer: COMMERCIAL

## 2021-01-11 DIAGNOSIS — M85.9 LOW BONE DENSITY: ICD-10-CM

## 2021-01-11 PROCEDURE — 77080 DXA BONE DENSITY AXIAL: CPT | Performed by: INTERNAL MEDICINE

## 2021-01-19 DIAGNOSIS — M85.9 LOW BONE DENSITY: ICD-10-CM

## 2021-01-19 LAB — TSH SERPL DL<=0.005 MIU/L-ACNC: 2.3 MU/L (ref 0.4–4)

## 2021-01-19 PROCEDURE — 84443 ASSAY THYROID STIM HORMONE: CPT | Performed by: PATHOLOGY

## 2021-01-19 PROCEDURE — 36415 COLL VENOUS BLD VENIPUNCTURE: CPT | Performed by: PATHOLOGY

## 2021-09-25 ENCOUNTER — HEALTH MAINTENANCE LETTER (OUTPATIENT)
Age: 71
End: 2021-09-25

## 2021-11-20 ENCOUNTER — HEALTH MAINTENANCE LETTER (OUTPATIENT)
Age: 71
End: 2021-11-20

## 2022-01-15 ENCOUNTER — HEALTH MAINTENANCE LETTER (OUTPATIENT)
Age: 72
End: 2022-01-15

## 2022-12-26 ENCOUNTER — HEALTH MAINTENANCE LETTER (OUTPATIENT)
Age: 72
End: 2022-12-26

## 2023-04-16 ENCOUNTER — HEALTH MAINTENANCE LETTER (OUTPATIENT)
Age: 73
End: 2023-04-16

## 2024-02-04 ENCOUNTER — HEALTH MAINTENANCE LETTER (OUTPATIENT)
Age: 74
End: 2024-02-04